# Patient Record
(demographics unavailable — no encounter records)

---

## 2025-07-16 NOTE — PHYSICAL EXAM
[Normal] : mucosa is normal [Midline] : trachea located in midline position [FreeTextEntry1] : Procedure: Microscopic Ear Exam  Left ear:  Ear canal intact without inflammation or lesion.   Tympanic membrane intact without inflammation.  Right ear:  Ear canal intact without inflammation or lesion.   Tympanic membrane intact without inflammation.  [place] Hearing Assessment 512 Hz: Stewart test: referred to the bilateral Rinne test: 	Right Ear: Air Conduction > Bone Conduction 	Left Ear:   Air Conduction > Bone conduction

## 2025-07-16 NOTE — ASSESSMENT
[FreeTextEntry1] : Longstanding history of bilateral hearing loss.  The patient has not fully adopted the use of amplification and is quite comfortable without hearing aids.  He is, however, admittedly missing conversations and relies on his wife frequently.  I have reviewed this with him.  Although adapting to hearing aids may take some time, I have encouraged him to obtain up-to-date hearing aids according to his current hearing loss and to gradually increase their usage until he is a full-time user.  We briefly discussed Oobafit technology which he may be interested in as well.  Refer to audiology for hearing aid evaluation.  Annual follow-up recommended with me and as needed. Time based billing: This encounter required more than 45 minutes of time excluding procedures.

## 2025-07-16 NOTE — HISTORY OF PRESENT ILLNESS
[de-identified] : TANYA TAPIA has a history of hearing loss since his 20's bilaterally. Tinnitus and progressive hearing loss reported.  Used hearing aids. No prior ear disease.  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

## 2025-07-16 NOTE — DATA REVIEWED
[de-identified] : In light of the patients current symptoms, Complete audiometry was ordered and completed today. I have interpreted these results and reviewed them in detail with the patient. Bilateral sensorineural hearing loss with intact speech recognition. Today's testing is compared to prior testing